# Patient Record
Sex: FEMALE | Race: WHITE
[De-identification: names, ages, dates, MRNs, and addresses within clinical notes are randomized per-mention and may not be internally consistent; named-entity substitution may affect disease eponyms.]

---

## 2017-08-09 ENCOUNTER — HOSPITAL ENCOUNTER (OUTPATIENT)
Dept: HOSPITAL 47 - RADMRIMAIN | Age: 77
Discharge: HOME | End: 2017-08-09
Payer: MEDICARE

## 2017-08-09 DIAGNOSIS — M51.36: Primary | ICD-10-CM

## 2017-08-09 DIAGNOSIS — M12.88: ICD-10-CM

## 2017-08-09 PROCEDURE — 72148 MRI LUMBAR SPINE W/O DYE: CPT

## 2017-08-09 NOTE — MR
EXAMINATION TYPE: MR lumbar spine wo con

 

DATE OF EXAM: 8/9/2017

 

COMPARISON: Report from prior lumbar MRI from outside institution dated 2009

 

HISTORY: Low back pain

 

TECHNIQUE: 

Multiplanar, multisequence images of the lumbar spine were acquired.

 

L1-L2: Left lateral extension of endplate disc complex results in right-sided foraminal encroachment,
 anterolateral mass effect on the thecal sac. No significant central stenosis.

 

L2-L3: Mild facet arthropathy. No significant central stenosis or foraminal encroachment. Minimal pos
terior broad-based disc bulge causes only slight anterior mass effect on the thecal sac.

 

L3-L4: Broad-based posterior disc bulge causes minimal anterior mass effect on the thecal sac. Facet 
arthropathy with hypertrophy of the ligamentum flavum encroaches on the lateral recesses. Circumferen
tial extension of disc material encroaches somewhat on the foramina. There is a trefoil appearance of
 the thecal sac. No significant central stenosis.

 

L4-L5: Listhesis contributes to cause some mild anterior mass effect on the thecal sac, there is face
t arthropathy with hypertrophy of the ligamentum flavum encroaching on the lateral recesses, foramina
l encroachment is greater on the left than on the right. No significant central stenosis.

 

L5-S1: Facet arthropathy changes with hypertrophy of the ligamentum flavum is noted encroaching somew
hat on the left lateral recess. Circumferential posterior disc bulge causes minimal anterior mass eff
ect on the thecal sac. Circumferential extension of disc material encroaches somewhat on the foramina
.

 

Lumbar segments are intact.  No paraspinal masses are identified.  Conus medullaris has a normal appe
arance. Minimal anterolisthesis grade 1 L4-5. There is multilevel spondylosis. Loss of disc height an
d signal greatest at L4-5, L1-2.

 

IMPRESSION:

Degenerative disc disease, multilevel facet arthropathy and foraminal encroachment as described. No s
ignificant central canal stenosis.

## 2018-03-09 ENCOUNTER — HOSPITAL ENCOUNTER (OUTPATIENT)
Dept: HOSPITAL 47 - EC | Age: 78
Setting detail: OBSERVATION
LOS: 1 days | Discharge: HOME | End: 2018-03-10
Payer: MEDICARE

## 2018-03-09 DIAGNOSIS — I16.0: ICD-10-CM

## 2018-03-09 DIAGNOSIS — Z79.899: ICD-10-CM

## 2018-03-09 DIAGNOSIS — Z88.1: ICD-10-CM

## 2018-03-09 DIAGNOSIS — E66.9: ICD-10-CM

## 2018-03-09 DIAGNOSIS — I10: ICD-10-CM

## 2018-03-09 DIAGNOSIS — J18.9: ICD-10-CM

## 2018-03-09 DIAGNOSIS — E78.5: ICD-10-CM

## 2018-03-09 DIAGNOSIS — R07.89: Primary | ICD-10-CM

## 2018-03-09 DIAGNOSIS — Z98.84: ICD-10-CM

## 2018-03-09 DIAGNOSIS — Z80.7: ICD-10-CM

## 2018-03-09 DIAGNOSIS — I42.9: ICD-10-CM

## 2018-03-09 DIAGNOSIS — Z88.8: ICD-10-CM

## 2018-03-09 DIAGNOSIS — Z87.891: ICD-10-CM

## 2018-03-09 DIAGNOSIS — Z82.49: ICD-10-CM

## 2018-03-09 DIAGNOSIS — M19.90: ICD-10-CM

## 2018-03-09 DIAGNOSIS — I49.3: ICD-10-CM

## 2018-03-09 DIAGNOSIS — R79.89: ICD-10-CM

## 2018-03-09 DIAGNOSIS — R01.1: ICD-10-CM

## 2018-03-09 DIAGNOSIS — I44.7: ICD-10-CM

## 2018-03-09 LAB
ALBUMIN SERPL-MCNC: 4 G/DL (ref 3.5–5)
ALP SERPL-CCNC: 72 U/L (ref 38–126)
ALT SERPL-CCNC: 19 U/L (ref 9–52)
ANION GAP SERPL CALC-SCNC: 10 MMOL/L
APTT BLD: 22.6 SEC (ref 22–30)
AST SERPL-CCNC: 23 U/L (ref 14–36)
BASOPHILS # BLD AUTO: 0.1 K/UL (ref 0–0.2)
BASOPHILS NFR BLD AUTO: 1 %
BUN SERPL-SCNC: 16 MG/DL (ref 7–17)
CALCIUM SPEC-MCNC: 9.2 MG/DL (ref 8.4–10.2)
CHLORIDE SERPL-SCNC: 104 MMOL/L (ref 98–107)
CK SERPL-CCNC: 58 U/L (ref 30–135)
CK SERPL-CCNC: 63 U/L (ref 30–135)
CK SERPL-CCNC: 76 U/L (ref 30–135)
CO2 SERPL-SCNC: 24 MMOL/L (ref 22–30)
D DIMER PPP FEU-MCNC: 0.62 MG/L FEU (ref ?–0.6)
EOSINOPHIL # BLD AUTO: 0.1 K/UL (ref 0–0.7)
EOSINOPHIL NFR BLD AUTO: 1 %
ERYTHROCYTE [DISTWIDTH] IN BLOOD BY AUTOMATED COUNT: 5.09 M/UL (ref 3.8–5.4)
ERYTHROCYTE [DISTWIDTH] IN BLOOD: 12.8 % (ref 11.5–15.5)
GLUCOSE SERPL-MCNC: 117 MG/DL (ref 74–99)
HCT VFR BLD AUTO: 42.9 % (ref 34–46)
HGB BLD-MCNC: 15.1 GM/DL (ref 11.4–16)
INR PPP: 1.1 (ref ?–1.2)
LIPASE SERPL-CCNC: 82 U/L (ref 23–300)
LYMPHOCYTES # SPEC AUTO: 0.9 K/UL (ref 1–4.8)
LYMPHOCYTES NFR SPEC AUTO: 11 %
MAGNESIUM SPEC-SCNC: 1.8 MG/DL (ref 1.6–2.3)
MCH RBC QN AUTO: 29.7 PG (ref 25–35)
MCHC RBC AUTO-ENTMCNC: 35.2 G/DL (ref 31–37)
MCV RBC AUTO: 84.2 FL (ref 80–100)
MONOCYTES # BLD AUTO: 0.6 K/UL (ref 0–1)
MONOCYTES NFR BLD AUTO: 7 %
NEUTROPHILS # BLD AUTO: 6.6 K/UL (ref 1.3–7.7)
NEUTROPHILS NFR BLD AUTO: 78 %
PLATELET # BLD AUTO: 260 K/UL (ref 150–450)
POTASSIUM SERPL-SCNC: 4.2 MMOL/L (ref 3.5–5.1)
PROT SERPL-MCNC: 6.5 G/DL (ref 6.3–8.2)
PT BLD: 10.3 SEC (ref 9–12)
SODIUM SERPL-SCNC: 138 MMOL/L (ref 137–145)
TROPONIN I SERPL-MCNC: 0.03 NG/ML (ref 0–0.03)
TROPONIN I SERPL-MCNC: 0.03 NG/ML (ref 0–0.03)
TROPONIN I SERPL-MCNC: 0.04 NG/ML (ref 0–0.03)
WBC # BLD AUTO: 8.5 K/UL (ref 3.8–10.6)

## 2018-03-09 PROCEDURE — 93005 ELECTROCARDIOGRAM TRACING: CPT

## 2018-03-09 PROCEDURE — 84484 ASSAY OF TROPONIN QUANT: CPT

## 2018-03-09 PROCEDURE — 83735 ASSAY OF MAGNESIUM: CPT

## 2018-03-09 PROCEDURE — 71275 CT ANGIOGRAPHY CHEST: CPT

## 2018-03-09 PROCEDURE — 96372 THER/PROPH/DIAG INJ SC/IM: CPT

## 2018-03-09 PROCEDURE — 83880 ASSAY OF NATRIURETIC PEPTIDE: CPT

## 2018-03-09 PROCEDURE — 71046 X-RAY EXAM CHEST 2 VIEWS: CPT

## 2018-03-09 PROCEDURE — 36415 COLL VENOUS BLD VENIPUNCTURE: CPT

## 2018-03-09 PROCEDURE — 85730 THROMBOPLASTIN TIME PARTIAL: CPT

## 2018-03-09 PROCEDURE — 96375 TX/PRO/DX INJ NEW DRUG ADDON: CPT

## 2018-03-09 PROCEDURE — 82550 ASSAY OF CK (CPK): CPT

## 2018-03-09 PROCEDURE — 93306 TTE W/DOPPLER COMPLETE: CPT

## 2018-03-09 PROCEDURE — 85379 FIBRIN DEGRADATION QUANT: CPT

## 2018-03-09 PROCEDURE — 96365 THER/PROPH/DIAG IV INF INIT: CPT

## 2018-03-09 PROCEDURE — 85025 COMPLETE CBC W/AUTO DIFF WBC: CPT

## 2018-03-09 PROCEDURE — 85610 PROTHROMBIN TIME: CPT

## 2018-03-09 PROCEDURE — 80061 LIPID PANEL: CPT

## 2018-03-09 PROCEDURE — 82553 CREATINE MB FRACTION: CPT

## 2018-03-09 PROCEDURE — 83690 ASSAY OF LIPASE: CPT

## 2018-03-09 PROCEDURE — 99285 EMERGENCY DEPT VISIT HI MDM: CPT

## 2018-03-09 PROCEDURE — 80053 COMPREHEN METABOLIC PANEL: CPT

## 2018-03-09 RX ADMIN — ATORVASTATIN CALCIUM SCH MG: 80 TABLET, FILM COATED ORAL at 14:23

## 2018-03-09 RX ADMIN — ASPIRIN SCH DROP: 325 TABLET ORAL at 16:20

## 2018-03-09 RX ADMIN — OFLOXACIN SCH DROPS: 3 SOLUTION/ DROPS OPHTHALMIC at 11:46

## 2018-03-09 RX ADMIN — OFLOXACIN SCH DROPS: 3 SOLUTION/ DROPS OPHTHALMIC at 19:56

## 2018-03-09 RX ADMIN — DILTIAZEM HYDROCHLORIDE SCH MG: 180 CAPSULE, COATED, EXTENDED RELEASE ORAL at 12:07

## 2018-03-09 RX ADMIN — METOPROLOL TARTRATE SCH: 25 TABLET, FILM COATED ORAL at 12:13

## 2018-03-09 RX ADMIN — GUAIFENESIN PRN MG: 200 SOLUTION ORAL at 21:22

## 2018-03-09 RX ADMIN — METOPROLOL TARTRATE SCH MG: 25 TABLET, FILM COATED ORAL at 19:56

## 2018-03-09 RX ADMIN — METOPROLOL TARTRATE SCH: 25 TABLET, FILM COATED ORAL at 20:02

## 2018-03-09 RX ADMIN — LISINOPRIL SCH MG: 20 TABLET ORAL at 12:07

## 2018-03-09 RX ADMIN — OFLOXACIN SCH DROPS: 3 SOLUTION/ DROPS OPHTHALMIC at 16:20

## 2018-03-09 RX ADMIN — ISOSORBIDE MONONITRATE SCH MG: 30 TABLET, EXTENDED RELEASE ORAL at 12:07

## 2018-03-09 RX ADMIN — HEPARIN SODIUM SCH UNIT: 5000 INJECTION, SOLUTION INTRAVENOUS; SUBCUTANEOUS at 19:55

## 2018-03-09 NOTE — P.HPIM
History of Present Illness


H&P Date: 18


Chief Complaint: Chest pressure and cough





This is a 77-year-old female, patient of Dr. Melgar.  She has a known past 

medical history of hypertension, hyperlipidemia, cardiomyopathy, left bundle 

branch block, PVCs and heart murmur.  She reports about a 4 day history of 

productive cough.  She states that her cough is showing some improvement.  

However, this morning she started to have a lot of chest pressure in the center 

of her chest.  It did not radiate.  But she did become nauseous and had some 

sweating.  She took 2 nitro and had some relief of her symptoms.  The pain did 

come back.  She presented to the emergency room for further evaluation and 

treatment.  She had elevated blood pressure with a 210/100.  EKG had shown 

normal sinus rhythm with sinus arrhythmia.  First troponin was negative second 

troponin minimally elevated at 0.037.  She did have elevated d-dimer CTA of the 

chest was negative for PE.  Did show clustered nodular densities within the 

posterior basilar segment of the right lower lobe are most suspicious of a 

developing pneumonia although multiple pulmonary nodules are possible they're 

less likely.  Follow-up after treatment could be performed to exclude pulmonary 

nodule.  Associated mediastinal adenopathy is likely reactive and only slightly 

more pronounced than prior exam of .  Patient was started on IV Levaquin.  

Her last stress test was about 5 months ago and at that time she had an echo 

completed [facility].  Cardiology is following and they 

are reviewing these results.  Patient reports her last heart catheterization 

was in .  She does have a prior history of smoking for only about 4 years.  

Pulmonary service will be consulted





Review of Systems





Please refer to HPI otherwise unremarkable





Past Medical History


Past Medical History: Hyperlipidemia, Hypertension, Neurologic Disorder, 

Osteoarthritis (OA)


Additional Past Medical History / Comment(s): pvc 's  murmur  lbbb 

cardiomyopathy atheroscleroris


History of Any Multi-Drug Resistant Organisms: None Reported


Past Surgical History: Adenoidectomy, Appendectomy, Cholecystectomy, 

Hysterectomy, Joint Replacement, Orthopedic Surgery, Tonsillectomy


Additional Past Surgical History / Comment(s): lapband


Past Anesthesia/Blood Transfusion Reactions: Postoperative Nausea & Vomiting (

PONV)


Past Psychological History: No Psychological Hx Reported


Smoking Status: Never smoker


Past Alcohol Use History: None Reported


Past Drug Use History: None Reported





- Past Family History


  ** Father


Family Medical History: Cancer


Additional Family Medical History / Comment(s): Father had lymphosarcoma





  ** Mother


Family Medical History: Coronary Artery Disease (CAD), Myocardial Infarction (MI

)


Additional Family Medical History / Comment(s): Mother  of a MI at the age 

of 66yrs.





Medications and Allergies


 Home Medications











 Medication  Instructions  Recorded  Confirmed  Type


 


Cyanocobalamin (Vitamin B-12) 1,000 mcg PO PC-LUNCH 18 History





[Vitamin B-12]    


 


Diltiazem HCl [Diltiazem 24Hr ER] 180 mg PO DAILY 18 History


 


HYDROcodone/APAP 5-325MG [Norco 0.5 tab PO HS PRN 18 History





5-325]    


 


Isosorbide Mononitrate ER [Imdur] 30 mg PO AC-BRKFST 18 History


 


Lisinopril [Prinivil] 20 mg PO DAILY 18 History


 


Lovastatin [Mevacor] 20 mg PO HS 18 History


 


Magnesium 200 mg PO HS 18 History


 


Nepafenac [Ilevro] 1 drop LEFT EYE QAM 18 History


 


Nitroglycerin Sl Tabs [Nitrostat] 0.4 mg SUBLINGUAL Q5M PRN 18 

History


 


Ofloxacin 0.3% Ophth Soln [Ocuflox 1 drop LEFT EYE TID 18 History





Ophth Soln]    


 


cloNIDine HCL [Catapres] 0.1 mg PO DAILY 18 History











 Allergies











Allergy/AdvReac Type Severity Reaction Status Date / Time


 


erythromycin base Allergy  Rash/Hives Verified 18 07:37


 


meclizine Allergy  Rash/Hives Verified 18 07:37














Physical Exam


Vitals: 


 Vital Signs











  Temp Pulse Pulse Resp BP BP BP


 


 18 11:52  99.3 F   73  16    188/63


 


 18 09:01  98.2 F      142/58 


 


 18 08:00  98.1 F  74   16  158/69  


 


 18 06:40     20   


 


 18 06:37      165/72  


 


 18 06:30   88   20  190/78  


 


 18 06:13  99.3 F  89   18  210/100  














  Pulse Ox


 


 18 11:52  93 L


 


 18 09:01  91 L


 


 18 08:00  95


 


 18 06:40 


 


 18 06:37 


 


 18 06:30  95


 


 18 06:13  97








 Intake and Output











 18





 22:59 06:59 14:59


 


Other:   


 


  Weight  77.111 kg 














Head normocephalic


Neck supple


Lungs diminished with a few coarse breath sounds.  No wheezing.  No tenderness 

with palpation of the chest wall


Heart regular rate and rhythm S1-S2, no rub or gallop


Abdomen is soft nontender nondistended positive bowel sounds no 

hepatosplenomegaly


Extremities no edema


Neuro alert and orientated to 3





Results


CBC & Chem 7: 


 18 06:10





 18 06:10


Labs: 


 Abnormal Lab Results - Last 24 Hours (Table)











  18 Range/Units





  06:10 06:10 06:10 


 


Lymphocytes #  0.9 L    (1.0-4.8)  k/uL


 


D-Dimer    0.62 H  (<0.60)  mg/L FEU


 


Glucose   117 H   (74-99)  mg/dL


 


Troponin I     (0.000-0.034)  ng/mL














  18 Range/Units





  12:21 


 


Lymphocytes #   (1.0-4.8)  k/uL


 


D-Dimer   (<0.60)  mg/L FEU


 


Glucose   (74-99)  mg/dL


 


Troponin I  0.037 H*  (0.000-0.034)  ng/mL














Thrombosis Risk Factor Assmnt





- Choose All That Apply


Any of the Below Risk Factors Present?: Yes


Each Factor Represents 1 point: Obesity (BMI >25)


Other Risk Factors: Yes


Each Risk Factor Represents 3 Points: Age 75 years or older


Other congenital or acquired thrombophilia - If yes, enter type in comment: No


Thrombosis Risk Factor Assessment Total Risk Factor Score: 4


Thrombosis Risk Factor Assessment Level: Moderate Risk





Assessment and Plan


Assessment: 





1.  Chest pain: Minimal elevation and second troponin of 0.037.  Case discussed 

with cardiology.  They did not fill it was related to an acute coronary 

syndrome.  EKG showing a normal sinus rhythm with sinus arrhythmia.  Third 

troponin pending.  CTA was negative for PE.  Chest pain symptoms could be 

related to her cough from her pneumonia.  Continue to monitor.  Cardiology is 

reviewing the stress test and echo that was performed about 5 months ago 

outpatient





2.  Community-acquired Pneumonia: CT chest shows clustered nodular densities 

within the posterior basilar segment of the right lower lobe possibly related 

to pneumonia or a pulmonary nodule.  Consult pulmonary service.  Patient's been 

started on Levaquin 500 mg IV daily in the emergency room.  Check sputum culture





3.  Hypertensive urgency in the emergency room with a blood pressure of 210/

100.  Patient did give 1 dose of IV labetalol in the ER.  Case discussed with 

cardiology NP.  They have increased her Catapres to 0.2 mg daily.  Resume her 

other home BP meds





4.  Hyperlipidemia continue statin





5.  History of cardiomyopathy.  Followed by cardiology





6.  Osteoarthritis





GI prophylaxis Pepcid and DVT prophylaxis subcu heparin








Time with Patient: Greater than 30 (Greater than 50% of the total time spent in 

counseling and coordination of care.I performed an examination of the patient 

and discussed their management with the physician Assistant.  I have reviewed 

the Physician Assistant's notes and agree with the documented findings and plan 

of care)

## 2018-03-09 NOTE — CT
EXAMINATION TYPE: CT angio chest

 

DATE OF EXAM: 3/9/2018

 

COMPARISON: 6/17/2010

 

HISTORY: Chest pain

 

CT DLP: 326.6 mGycm. Automated Exposure Control for Dose Reduction was Utilized.

 

 

CONTRAST: 

CTA scan of the thorax is performed with IV Contrast, patient injected with 78 mL of Omnipaque 350, p
ulmonary embolism protocol.  MIP Images are created on CT scanner and reviewed.

 

FINDINGS:

 

LUNGS: There are scattered areas of subsegmental atelectasis and multiple other nodular densities wit
hin the posterior basilar segment of the right lower lobe suspicious for early infectious etiology shaw
ch as pneumonia. The lungs are grossly clear, there is no concerning parenchymal mass or nodule ident
ified.   There is no pleural effusion or pneumothorax seen.  The tracheobronchial tree is patent.

 

MEDIASTINUM: There is satisfactory enhancement of the pulmonary artery and its branches, there is no 
CT evidence for pulmonary embolism. Mediastinal adenopathy is seen within the right hilum measuring 1
.2 cm, within the subcarinal space measuring 1.5 cm, and within the, paratracheal space measuring 1.2
 cm. No cardiomegaly or pericardial effusion is seen. Normal variant branching vasculature are seen a
s the left vertebral artery has a direct origin from the aortic arch. Moderate three-vessel coronary 
artery calcifications are present.

 

OTHER: Gastric lap band is noted in the upper abdomen, bile duct appears prominent although within no
rmal limits given the patient's age. Moderate multilevel degenerative changes of the thoracic spine a
re seen.

 

IMPRESSION: 

1. No evidence of pulmonary embolism.

2. Clustered nodular densities within the posterior basilar segment of the right lower lobe are most 
suspicious for developing pneumonia although multiple pulmonary nodules are possible they are less li
nisa. Follow-up after treatment could be performed to exclude pulmonary nodule. Associated mediastina
l adenopathy is likely reactive and only slightly more pronounced than on the prior exam of 2010.

## 2018-03-09 NOTE — P.CRDCN
History of Present Illness


Consult date: 03/09/18


History of present illness: 


This is a 77-year-old female with history of hypertension and 

hypercholesterolemia who has been suffering from sore throat and cough for 

about a couple weeks.  Patient came to the hospital because of persistent cough 

and also feeling hot.  She also complains of some chest heaviness in the middle 

of the chest.  She claims that heaviness did not change with cough or 

inspiration.  She took couple of nitroglycerin at home without much relief.  

Finally patient came to the emergency room.  Her d-dimer was elevated.  Patient 

had a computed tomography scan of the chest which was negative for pulmonary 

emboli or dissection.  However, there was findings consistent with possible 

pneumonia.  Patient is admitted and is being treated for pneumonia with 

antibiotics.  At the time of my examination patient seemed to be feeling 

better.  Denies any chest pain at this time.  Patient recently had a stress 

test and echocardiogram in our office and we're in the process of getting 

copies of the.  I will also get a chest x-ray.  If pneumonia is confirmed, I'll 

continue with antibiotic therapy.  Probably no further cardiac workup at this 

time unless patient has recurrence of symptoms.








Past Medical History


Past Medical History: Hyperlipidemia, Hypertension, Neurologic Disorder, 

Osteoarthritis (OA)


Additional Past Medical History / Comment(s): pvc 's  murmur  lbbb 

cardiomyopathy atheroscleroris


History of Any Multi-Drug Resistant Organisms: None Reported


Past Surgical History: Adenoidectomy, Appendectomy, Cholecystectomy, 

Hysterectomy, Joint Replacement, Orthopedic Surgery, Tonsillectomy


Additional Past Surgical History / Comment(s): lapband


Past Psychological History: No Psychological Hx Reported


Smoking Status: Never smoker


Past Alcohol Use History: None Reported


Past Drug Use History: None Reported





Medications and Allergies


 Home Medications











 Medication  Instructions  Recorded  Confirmed  Type


 


Cyanocobalamin (Vitamin B-12) 1,000 mcg PO PC-LUNCH 03/09/18 03/09/18 History





[Vitamin B-12]    


 


Diltiazem HCl [Diltiazem 24Hr ER] 180 mg PO DAILY 03/09/18 03/09/18 History


 


HYDROcodone/APAP 5-325MG [Norco 0.5 tab PO HS PRN 03/09/18 03/09/18 History





5-325]    


 


Isosorbide Mononitrate ER [Imdur] 30 mg PO AC-BRKFST 03/09/18 03/09/18 History


 


Lisinopril [Prinivil] 20 mg PO DAILY 03/09/18 03/09/18 History


 


Lovastatin [Mevacor] 20 mg PO HS 03/09/18 03/09/18 History


 


Magnesium 200 mg PO HS 03/09/18 03/09/18 History


 


Nepafenac [Ilevro] 1 drop LEFT EYE QAM 03/09/18 03/09/18 History


 


Nitroglycerin Sl Tabs [Nitrostat] 0.4 mg SUBLINGUAL Q5M PRN 03/09/18 03/09/18 

History


 


Ofloxacin 0.3% Ophth Soln [Ocuflox 1 drop LEFT EYE TID 03/09/18 03/09/18 History





Ophth Soln]    


 


cloNIDine HCL [Catapres] 0.1 mg PO DAILY 03/09/18 03/09/18 History











 Allergies











Allergy/AdvReac Type Severity Reaction Status Date / Time


 


erythromycin base Allergy  Rash/Hives Verified 03/09/18 07:37


 


meclizine Allergy  Rash/Hives Verified 03/09/18 07:37














Physical Exam


Vitals: 


 Vital Signs











  Temp Pulse Resp BP BP Pulse Ox


 


 03/09/18 09:01  98.2 F     142/58  91 L


 


 03/09/18 08:00  98.1 F  74  16  158/69   95


 


 03/09/18 06:40    20   


 


 03/09/18 06:37     165/72  


 


 03/09/18 06:30   88  20  190/78   95


 


 03/09/18 06:13  99.3 F  89  18  210/100   97








 Intake and Output











 03/08/18 03/09/18 03/09/18





 22:59 06:59 14:59


 


Other:   


 


  Weight  77.111 kg 














GENERAL EXAM: Patient is alert and oriented and doesn't appear to be in any 

acute distress


HEENT: Normocephalic. Normal reaction of pupils, equal size, normal range of 

extraocular motion. No erythema or exudates in the throat.


NECK: No masses, no nuchal rigidity.


CHEST: No chest wall deformity.


LUNGS: Equal air entry with no crackles or wheeze.


HEART: S1 and S2 normal with no audible mumurs or gallops. Regular rhythm,


ABDOMEN: No hepatosplenomegaly, normal bowel sounds, no guarding or rigidity.


SKIN: No rashes


CENTRAL NERVOUS SYSTEM: No focal deficits.


EXTREMITIES: No cyanosis, clubbing or edema.





Results





 03/09/18 06:10





 03/09/18 06:10


 Cardiac Enzymes











  03/09/18 03/09/18 Range/Units





  06:10 06:10 


 


AST   23  (14-36)  U/L


 


CK-MB (CK-2)  1.2   (0.0-2.4)  ng/mL


 


Troponin I  0.032   (0.000-0.034)  ng/mL








 Coagulation











  03/09/18 Range/Units





  06:10 


 


PT  10.3  (9.0-12.0)  sec


 


APTT  22.6  (22.0-30.0)  sec








 CBC











  03/09/18 Range/Units





  06:10 


 


WBC  8.5  (3.8-10.6)  k/uL


 


RBC  5.09  (3.80-5.40)  m/uL


 


Hgb  15.1  (11.4-16.0)  gm/dL


 


Hct  42.9  (34.0-46.0)  %


 


Plt Count  260  (150-450)  k/uL








 Comprehensive Metabolic Panel











  03/09/18 Range/Units





  06:10 


 


Sodium  138  (137-145)  mmol/L


 


Potassium  4.2  (3.5-5.1)  mmol/L


 


Chloride  104  ()  mmol/L


 


Carbon Dioxide  24  (22-30)  mmol/L


 


BUN  16  (7-17)  mg/dL


 


Creatinine  0.60  (0.52-1.04)  mg/dL


 


Glucose  117 H  (74-99)  mg/dL


 


Calcium  9.2  (8.4-10.2)  mg/dL


 


AST  23  (14-36)  U/L


 


ALT  19  (9-52)  U/L


 


Alkaline Phosphatase  72  ()  U/L


 


Total Protein  6.5  (6.3-8.2)  g/dL


 


Albumin  4.0  (3.5-5.0)  g/dL








 Current Medications











Generic Name Dose Route Start Last Admin





  Trade Name Freq  PRN Reason Stop Dose Admin


 


Aspirin  325 mg  03/10/18 09:00  





  Aspirin  PO   





  DAILY [facility]   


 


Atorvastatin Calcium  80 mg  03/09/18 09:00  





  Lipitor  PO   





  DAILY [facility]   


 


Levofloxacin 500 mg/ IV  100 mls @ 100 mls/hr  03/10/18 09:00  





  Solution  IVPB   





  Q24H [facility]   


 


Metoprolol Tartrate  25 mg  03/09/18 09:00  





  Lopressor  PO   





  BID [facility]   


 


Miscellaneous Information  1 each  03/09/18 06:31  





  Rx Info: Iv Contrast Was Given  MISCELLANE  03/11/18 06:31  





  DAILY PRN   





  Per Protocol   


 


Morphine Sulfate  4 mg  03/09/18 06:05  





  Morphine Sulfate (Inj)  IV   





  Q5M PRN   





  Chest Pain   


 


Nitroglycerin  0.4 mg  03/09/18 06:05  





  Nitrostat  SUBLINGUAL   





  Q5M PRN   





  Chest Pain   








 Intake and Output











 03/08/18 03/09/18 03/09/18





 22:59 06:59 14:59


 


Other:   


 


  Weight  77.111 kg 








 





 03/09/18 06:10 





 03/09/18 06:10 











EKG Interpretations (text)





Sinus rhythm with left bundle-branch block pattern





Assessment and Plan


(1) Left bundle branch block (LBBB)


Current Visit: Yes   Status: Acute   Code(s): I44.7 - LEFT BUNDLE-BRANCH BLOCK, 

UNSPECIFIED   SNOMED Code(s): 97378243


   





(2) Chest pain


Current Visit: Yes   Status: Acute   Code(s): R07.9 - CHEST PAIN, UNSPECIFIED   

SNOMED Code(s): 75385000


   





(3) Healthcare-associated pneumonia


Current Visit: Yes   Status: Acute   Code(s): J18.9 - PNEUMONIA, UNSPECIFIED 

ORGANISM   SNOMED Code(s): 129938713


   


Plan: 


I'll continue with antibiotic therapy.  I'll get a chest x-ray.  I'll get the 

recent cardiac testing done in our office.  Further recommendations depend upon 

clinical course.

## 2018-03-09 NOTE — XR
EXAMINATION TYPE: XR chest 2V

 

DATE OF EXAM: 3/9/2018

 

COMPARISON: 6/17/2010

 

HISTORY: Shortness of breath and productive cough

 

TECHNIQUE:  Frontal and lateral views of the chest are obtained.

 

FINDINGS:  Mild patchy opacity at the right lung base favored to represent atelectasis is no focal op
acity is seen on the lateral image, however this could represent early pneumonia in this patient with
 shortness of breath and productive cough. There is no pleural effusion or pneumothorax.  The cardiac
 silhouette size is within normal limits.   The osseous structures are intact. There is generalized o
sseous demineralization.

 

IMPRESSION:  New patchy right basilar opacity favored to represent atelectasis although this could re
present early developing pneumonia in this patient with shortness of breath and productive cough.

## 2018-03-09 NOTE — ED
General Adult HPI





<Eldon Forbes WM - Last Filed: 18 08:49>





- General


Source: RN notes reviewed, old records reviewed





<Shin Sanchez - Last Filed: 18 06:58>





- General


Stated complaint: chest pain


Time Seen by Provider: 18 06:02





- History of Present Illness


Initial comments: 





This is a 77-year-old female to the ER for evaluation regarding significant 

chest pain left-sided sharp chest pain rating to her back.  Patient has pain on 

and off for 3 days, patient states chest pain with severe tonight and decided 

to come to the emergency room.  Patient is in route to [facility] where she 

went to get checked out today as opposed earlier.  No shortness of breath, 

patient does have no chest pain at this time (Shin Sanchez)





- Related Data


 Home Medications











 Medication  Instructions  Recorded  Confirmed


 


Cyanocobalamin (Vitamin B-12) 1,000 mcg PO PC-LUNCH 18





[Vitamin B-12]   


 


Diltiazem HCl [Diltiazem 24Hr ER] 180 mg PO DAILY 18


 


HYDROcodone/APAP 5-325MG [Norco 0.5 tab PO HS PRN 18





5-325]   


 


Isosorbide Mononitrate ER [Imdur] 30 mg PO AC-BRKFST 18


 


Lisinopril [Prinivil] 20 mg PO DAILY 18


 


Lovastatin [Mevacor] 20 mg PO HS 18


 


Magnesium 200 mg PO HS 18


 


Nepafenac [Ilevro] 1 drop LEFT EYE QAM 18


 


Nitroglycerin Sl Tabs [Nitrostat] 0.4 mg SUBLINGUAL Q5M PRN 18


 


Ofloxacin 0.3% Ophth Soln [Ocuflox 1 drop LEFT EYE TID 18





Ophth Soln]   


 


cloNIDine HCL [Catapres] 0.1 mg PO DAILY 18








 Previous Rx's











 Medication  Instructions  Recorded


 


Aspirin 325 mg PO DAILY  tab 03/10/18


 


Levofloxacin [Levaquin] 500 mg PO DAILY 10 Days #10 tab 03/10/18


 


Metoprolol Tartrate [Lopressor] 25 mg PO BID  tab 03/10/18


 


guaiFENesin [Mucinex] 600 mg PO Q12HR 10 Days #20 03/10/18





 tablet.er 











 Allergies











Allergy/AdvReac Type Severity Reaction Status Date / Time


 


erythromycin base Allergy  Rash/Hives Verified 18 07:37


 


meclizine Allergy  Rash/Hives Verified 18 07:37














Review of Systems


ROS Other: All systems not noted in ROS Statement are negative.





<Eldon Forbes - Last Filed: 18 08:49>


ROS Other: All systems not noted in ROS Statement are negative.





<Shin Sanchez - Last Filed: 18 06:58>


ROS Statement: 


Those systems with pertinent positive or pertinent negative responses have been 

documented in the HPI.








Past Medical History


Past Medical History: Hyperlipidemia, Hypertension, Neurologic Disorder, 

Osteoarthritis (OA)


Additional Past Medical History / Comment(s): pvc 's  murmur  lbbb 

cardiomyopathy atheroscleroris


History of Any Multi-Drug Resistant Organisms: None Reported


Past Surgical History: Adenoidectomy, Appendectomy, Cholecystectomy, 

Hysterectomy, Joint Replacement, Orthopedic Surgery, Tonsillectomy


Additional Past Surgical History / Comment(s): lapband


Past Psychological History: No Psychological Hx Reported


Smoking Status: Never smoker


Past Alcohol Use History: None Reported


Past Drug Use History: None Reported





- Past Family History


  ** Father


Family Medical History: Cancer


Additional Family Medical History / Comment(s): Father had lymphosarcoma





  ** Mother


Family Medical History: Coronary Artery Disease (CAD), Myocardial Infarction (MI

)


Additional Family Medical History / Comment(s): Mother  of a MI at the age 

of 66yrs.





<Shin Sanchez - Last Filed: 18 06:58>





General Exam


General appearance: alert, in no apparent distress


Head exam: Present: atraumatic, normocephalic, normal inspection


Eye exam: Present: normal appearance, PERRL, EOMI.  Absent: scleral icterus, 

conjunctival injection, periorbital swelling


ENT exam: Present: normal exam, mucous membranes moist


Neck exam: Present: normal inspection.  Absent: tenderness, meningismus, 

lymphadenopathy


Respiratory exam: Present: normal lung sounds bilaterally.  Absent: respiratory 

distress, wheezes, rales, rhonchi, stridor


Cardiovascular Exam: Present: regular rate, normal rhythm, normal heart sounds.

  Absent: systolic murmur, diastolic murmur, rubs, gallop, clicks


GI/Abdominal exam: Present: soft, normal bowel sounds.  Absent: distended, 

tenderness, guarding, rebound, rigid


Extremities exam: Present: normal inspection, full ROM, normal capillary 

refill.  Absent: tenderness, pedal edema, joint swelling, calf tenderness


Back exam: Present: normal inspection


Neurological exam: Present: alert, oriented X3, CN II-XII intact


Psychiatric exam: Present: normal affect, normal mood


Skin exam: Present: warm, dry, intact, normal color.  Absent: rash





<Shin Sanchez - Last Filed: 18 06:58>


 Vital Signs











  18





  06:13 06:30 06:37


 


Temperature 99.3 F  


 


Pulse Rate 89 88 


 


Respiratory 18 20 





Rate   


 


Blood Pressure 210/100 190/78 165/72


 


O2 Sat by Pulse 97 95 





Oximetry   














  18





  06:40 08:00


 


Temperature  98.1 F


 


Pulse Rate  74


 


Respiratory 20 16





Rate  


 


Blood Pressure  158/69


 


O2 Sat by Pulse  95





Oximetry  














EKG Findings





- EKG Comments:


EKG Findings:: EKG shows sinus rhythm rate of 84, , , QTc 482





<Shin Sanchez - Last Filed: 18 06:58>





Medical Decision Making





- Lab Data


Result diagrams: 


 18 06:10





 18 06:10





<Eldon Forbes - Last Filed: 18 08:49>





- Lab Data


Result diagrams: 


 03/10/18 05:24





 03/10/18 05:24





<Shin Sanchez - Last Filed: 18 06:58>





- Medical Decision Making





77-year-old female presenting with chest pain.  Patient was signed out awaiting 

CT angiography.  Patient was admitted for serial cardiac enzymes and cardiology 

consult.  CT angiography is negative for PE or dissection, there is right lower 

lobe pneumonia.  Patient does admit to cough.  Antibiotics ordered. 








 Initial presentation, patient is quite hypertensive, this does resolve with 

medical treatment. (Eldon Forbes)





Disposition


Decision to Admit Reason: Admit from EC


Decision Date: 18


Decision Time: 08:00





<Eldon Forbes - Last Filed: 18 08:49>





<Shin Sanchez - Last Filed: 18 06:58>


Clinical Impression: 


 Healthcare-associated pneumonia, Chest pain





Disposition: ADMITTED AS IP TO THIS HOSP


Condition: Stable

## 2018-03-10 VITALS
DIASTOLIC BLOOD PRESSURE: 53 MMHG | SYSTOLIC BLOOD PRESSURE: 107 MMHG | HEART RATE: 57 BPM | TEMPERATURE: 97.9 F | RESPIRATION RATE: 18 BRPM

## 2018-03-10 LAB
ALBUMIN SERPL-MCNC: 2.9 G/DL (ref 3.5–5)
ALP SERPL-CCNC: 52 U/L (ref 38–126)
ALT SERPL-CCNC: 22 U/L (ref 9–52)
ANION GAP SERPL CALC-SCNC: 6 MMOL/L
AST SERPL-CCNC: 20 U/L (ref 14–36)
BASOPHILS # BLD AUTO: 0 K/UL (ref 0–0.2)
BASOPHILS NFR BLD AUTO: 0 %
BUN SERPL-SCNC: 25 MG/DL (ref 7–17)
CALCIUM SPEC-MCNC: 8.6 MG/DL (ref 8.4–10.2)
CHLORIDE SERPL-SCNC: 104 MMOL/L (ref 98–107)
CHOLEST SERPL-MCNC: 127 MG/DL (ref ?–200)
CO2 SERPL-SCNC: 25 MMOL/L (ref 22–30)
EOSINOPHIL # BLD AUTO: 0.1 K/UL (ref 0–0.7)
EOSINOPHIL NFR BLD AUTO: 2 %
ERYTHROCYTE [DISTWIDTH] IN BLOOD BY AUTOMATED COUNT: 4.27 M/UL (ref 3.8–5.4)
ERYTHROCYTE [DISTWIDTH] IN BLOOD: 13.3 % (ref 11.5–15.5)
GLUCOSE SERPL-MCNC: 91 MG/DL (ref 74–99)
HCT VFR BLD AUTO: 36.9 % (ref 34–46)
HDLC SERPL-MCNC: 50 MG/DL (ref 40–60)
HGB BLD-MCNC: 12 GM/DL (ref 11.4–16)
LDLC SERPL CALC-MCNC: 66 MG/DL (ref 0–99)
LYMPHOCYTES # SPEC AUTO: 1.6 K/UL (ref 1–4.8)
LYMPHOCYTES NFR SPEC AUTO: 33 %
MCH RBC QN AUTO: 28.2 PG (ref 25–35)
MCHC RBC AUTO-ENTMCNC: 32.5 G/DL (ref 31–37)
MCV RBC AUTO: 86.6 FL (ref 80–100)
MONOCYTES # BLD AUTO: 0.6 K/UL (ref 0–1)
MONOCYTES NFR BLD AUTO: 12 %
NEUTROPHILS # BLD AUTO: 2.4 K/UL (ref 1.3–7.7)
NEUTROPHILS NFR BLD AUTO: 50 %
PLATELET # BLD AUTO: 224 K/UL (ref 150–450)
POTASSIUM SERPL-SCNC: 4.3 MMOL/L (ref 3.5–5.1)
PROT SERPL-MCNC: 5.1 G/DL (ref 6.3–8.2)
SODIUM SERPL-SCNC: 135 MMOL/L (ref 137–145)
TRIGL SERPL-MCNC: 57 MG/DL (ref ?–150)
WBC # BLD AUTO: 4.9 K/UL (ref 3.8–10.6)

## 2018-03-10 RX ADMIN — DILTIAZEM HYDROCHLORIDE SCH MG: 180 CAPSULE, COATED, EXTENDED RELEASE ORAL at 08:29

## 2018-03-10 RX ADMIN — HEPARIN SODIUM SCH UNIT: 5000 INJECTION, SOLUTION INTRAVENOUS; SUBCUTANEOUS at 08:30

## 2018-03-10 RX ADMIN — GUAIFENESIN PRN MG: 200 SOLUTION ORAL at 08:31

## 2018-03-10 RX ADMIN — ATORVASTATIN CALCIUM SCH MG: 80 TABLET, FILM COATED ORAL at 08:29

## 2018-03-10 RX ADMIN — OFLOXACIN SCH DROPS: 3 SOLUTION/ DROPS OPHTHALMIC at 08:31

## 2018-03-10 RX ADMIN — ISOSORBIDE MONONITRATE SCH MG: 30 TABLET, EXTENDED RELEASE ORAL at 08:30

## 2018-03-10 RX ADMIN — LISINOPRIL SCH MG: 20 TABLET ORAL at 08:29

## 2018-03-10 RX ADMIN — METOPROLOL TARTRATE SCH: 25 TABLET, FILM COATED ORAL at 08:58

## 2018-03-10 RX ADMIN — ASPIRIN SCH DROP: 325 TABLET ORAL at 08:29

## 2018-03-10 NOTE — P.DS
Providers


Date of admission: 


03/09/18 06:05





Expected date of discharge: 03/10/18


Attending physician: 


Inge Munoz





Consults: 





 





03/09/18 06:05


Consult Physician Urgent 


   Consulting Provider: Miguel Kauffman


   Consult Reason/Comments: cp


   Do you want consulting provider notified?: Yes





03/09/18 14:57


Consult Physician Routine 


   Consulting Provider: Toby Tabor


   Consult Reason/Comments: pulmonary nodules


   Do you want consulting provider notified?: Yes











Primary care physician: 


Jade Meglar





[facility] Course: 








Diagnoses on discharge:





1.  Chest pain: Minimal elevation and second troponin of 0.037.  Case discussed 

with cardiology.  They did not fill it was related to an acute coronary 

syndrome.  EKG showing a normal sinus rhythm with sinus arrhythmia.  Third 

troponin pending.  CTA was negative for PE.  Chest pain symptoms could be 

related to her cough from her pneumonia.  Continue to monitor.  Cardiology is 

reviewing the stress test and echo that was performed about 5 months ago 

outpatient. Patient was evaluated by cardiology and cleared for discharge.





2.  Community-acquired Pneumonia: CT chest shows clustered nodular densities 

within the posterior basilar segment of the right lower lobe possibly related 

to pneumonia or a pulmonary nodule.  Consult pulmonary service.  Patient's been 

started on Levaquin 500 mg IV daily in the emergency room.  Check sputum culture





3.  Hypertensive urgency in the emergency room with a blood pressure of 210/

100.  Patient did give 1 dose of IV labetalol in the ER.  Case discussed with 

cardiology NP.  They have increased her Catapres to 0.2 mg daily.  Resume her 

other home BP meds





4.  Hyperlipidemia continue statin





5.  History of cardiomyopathy.  Followed by cardiology





6.  Osteoarthritis








Hospital Course:








This is a 77-year-old female, patient of Dr. Melgar.  She has a known past 

medical history of hypertension, hyperlipidemia, cardiomyopathy, left bundle 

branch block, PVCs and heart murmur.  She reports about a 4 day history of 

productive cough.  She states that her cough is showing some improvement.  

However, this morning she started to have a lot of chest pressure in the center 

of her chest.  It did not radiate.  But she did become nauseous and had some 

sweating.  She took 2 nitro and had some relief of her symptoms.  The pain did 

come back.  She presented to the emergency room for further evaluation and 

treatment.  She had elevated blood pressure with a 210/100.  EKG had shown 

normal sinus rhythm with sinus arrhythmia.  First troponin was negative second 

troponin minimally elevated at 0.037.  She did have elevated d-dimer CTA of the 

chest was negative for PE.  Did show clustered nodular densities within the 

posterior basilar segment of the right lower lobe are most suspicious of a 

developing pneumonia although multiple pulmonary nodules are possible they're 

less likely.  Follow-up after treatment could be performed to exclude pulmonary 

nodule.  Associated mediastinal adenopathy is likely reactive and only slightly 

more pronounced than prior exam of 2010.  Patient was started on IV Levaquin.  

Her last stress test was about 5 months ago and at that time she had an echo 

completed outpatient [facility].  Cardiology is following and they 

are reviewing these results.  Patient reports her last heart catheterization 

was in 2010.  She does have a prior history of smoking for only about 4 years.  

Pulmonary service will be consulted.





On 03/10/2018 patient was seen and examined she is feeling better she is 

complaining of occasional cough without sputum production otherwise she denies 

any complaints there is no new episodes of chest pain.  She was seen by 

cardiology and was cleared to be discharged home.


Patient was counseled to stay to receive IV antibiotic for her pneumonia, 

however she was insisting on going home, she was given a prescription for 

Levaquin 500 milligram once daily for 10 days, she was also given the Mucinex 

600 mg twice daily for 10 days.  She was told to follow-up with her primary 

care physician Dr. Melgar within one week.





During this admission patient had elevated blood pressure readings, metoprolol 

tartrate 25 mg twice daily was added to her regimen, blood pressure at the time 

of discharge was well controlled at 119/56.


Patient also had aspirin 325 mg once daily added to her regimen.





Patient Condition at Discharge: Stable





Plan - Discharge Summary


Discharge Rx Participant: No


New Discharge Prescriptions: 


New


   Aspirin 325 mg PO DAILY  tab


   guaiFENesin [Mucinex] 600 mg PO Q12HR 10 Days #20 tablet.er


   Levofloxacin [Levaquin] 500 mg PO DAILY 10 Days #10 tab


   Metoprolol Tartrate [Lopressor] 25 mg PO BID  tab





Continue


   Nitroglycerin Sl Tabs [Nitrostat] 0.4 mg SUBLINGUAL Q5M PRN


     PRN Reason: Chest Pain


   HYDROcodone/APAP 5-325MG [Norco 5-325] 0.5 tab PO HS PRN


     PRN Reason: Pain


   Cyanocobalamin (Vitamin B-12) [Vitamin B-12] 1,000 mcg PO PC-LUNCH


   Lovastatin [Mevacor] 20 mg PO HS


   Lisinopril [Prinivil] 20 mg PO DAILY


   Isosorbide Mononitrate ER [Imdur] 30 mg PO AC-BRKFST


   cloNIDine HCL [Catapres] 0.1 mg PO DAILY


   Nepafenac [Ilevro] 1 drop LEFT EYE QAM


   Diltiazem HCl [Diltiazem 24Hr ER] 180 mg PO DAILY


   Ofloxacin 0.3% Ophth Soln [Ocuflox Ophth Soln] 1 drop LEFT EYE TID


   Magnesium 200 mg PO HS


Discharge Medication List





Cyanocobalamin (Vitamin B-12) [Vitamin B-12] 1,000 mcg PO PC-LUNCH 03/09/18 [

History]


Diltiazem HCl [Diltiazem 24Hr ER] 180 mg PO DAILY 03/09/18 [History]


HYDROcodone/APAP 5-325MG [Norco 5-325] 0.5 tab PO HS PRN 03/09/18 [History]


Isosorbide Mononitrate ER [Imdur] 30 mg PO AC-BRKFST 03/09/18 [History]


Lisinopril [Prinivil] 20 mg PO DAILY 03/09/18 [History]


Lovastatin [Mevacor] 20 mg PO HS 03/09/18 [History]


Magnesium 200 mg PO HS 03/09/18 [History]


Nepafenac [Ilevro] 1 drop LEFT EYE QAM 03/09/18 [History]


Nitroglycerin Sl Tabs [Nitrostat] 0.4 mg SUBLINGUAL Q5M PRN 03/09/18 [History]


Ofloxacin 0.3% Ophth Soln [Ocuflox Ophth Soln] 1 drop LEFT EYE TID 03/09/18 [

History]


cloNIDine HCL [Catapres] 0.1 mg PO DAILY 03/09/18 [History]


Aspirin 325 mg PO DAILY  tab 03/10/18 [Rx]


Levofloxacin [Levaquin] 500 mg PO DAILY 10 Days #10 tab 03/10/18 [Rx]


Metoprolol Tartrate [Lopressor] 25 mg PO BID  tab 03/10/18 [Rx]


guaiFENesin [Mucinex] 600 mg PO Q12HR 10 Days #20 tablet.er 03/10/18 [Rx]








Follow up Appointment(s)/Referral(s): 


Jade Melgar MD [Primary Care Provider] - 1-2 days

## 2018-03-12 ENCOUNTER — HOSPITAL ENCOUNTER (EMERGENCY)
Dept: HOSPITAL 47 - EC | Age: 78
Discharge: OTHER INSTITUTION | End: 2018-03-12
Payer: MEDICARE

## 2018-03-12 VITALS — RESPIRATION RATE: 18 BRPM

## 2018-03-12 VITALS — TEMPERATURE: 98.5 F

## 2018-03-12 VITALS — DIASTOLIC BLOOD PRESSURE: 45 MMHG | SYSTOLIC BLOOD PRESSURE: 90 MMHG | HEART RATE: 51 BPM

## 2018-03-12 DIAGNOSIS — Z88.8: ICD-10-CM

## 2018-03-12 DIAGNOSIS — E78.5: ICD-10-CM

## 2018-03-12 DIAGNOSIS — Z79.899: ICD-10-CM

## 2018-03-12 DIAGNOSIS — I25.10: ICD-10-CM

## 2018-03-12 DIAGNOSIS — I11.0: Primary | ICD-10-CM

## 2018-03-12 DIAGNOSIS — Z88.1: ICD-10-CM

## 2018-03-12 DIAGNOSIS — I50.9: ICD-10-CM

## 2018-03-12 LAB
ALBUMIN SERPL-MCNC: 3.5 G/DL (ref 3.5–5)
ALP SERPL-CCNC: 66 U/L (ref 38–126)
ALT SERPL-CCNC: 26 U/L (ref 9–52)
ANION GAP SERPL CALC-SCNC: 10 MMOL/L
APTT BLD: 24.2 SEC (ref 22–30)
AST SERPL-CCNC: 25 U/L (ref 14–36)
BASOPHILS # BLD AUTO: 0 K/UL (ref 0–0.2)
BASOPHILS NFR BLD AUTO: 1 %
BUN SERPL-SCNC: 17 MG/DL (ref 7–17)
CALCIUM SPEC-MCNC: 8.9 MG/DL (ref 8.4–10.2)
CHLORIDE SERPL-SCNC: 104 MMOL/L (ref 98–107)
CO2 SERPL-SCNC: 23 MMOL/L (ref 22–30)
EOSINOPHIL # BLD AUTO: 0.1 K/UL (ref 0–0.7)
EOSINOPHIL NFR BLD AUTO: 2 %
ERYTHROCYTE [DISTWIDTH] IN BLOOD BY AUTOMATED COUNT: 4.77 M/UL (ref 3.8–5.4)
ERYTHROCYTE [DISTWIDTH] IN BLOOD: 13.1 % (ref 11.5–15.5)
GLUCOSE SERPL-MCNC: 98 MG/DL (ref 74–99)
HCT VFR BLD AUTO: 40.6 % (ref 34–46)
HGB BLD-MCNC: 13.3 GM/DL (ref 11.4–16)
INR PPP: 1.1 (ref ?–1.2)
LYMPHOCYTES # SPEC AUTO: 1.2 K/UL (ref 1–4.8)
LYMPHOCYTES NFR SPEC AUTO: 24 %
MAGNESIUM SPEC-SCNC: 1.7 MG/DL (ref 1.6–2.3)
MCH RBC QN AUTO: 27.8 PG (ref 25–35)
MCHC RBC AUTO-ENTMCNC: 32.7 G/DL (ref 31–37)
MCV RBC AUTO: 85.1 FL (ref 80–100)
MONOCYTES # BLD AUTO: 0.3 K/UL (ref 0–1)
MONOCYTES NFR BLD AUTO: 6 %
NEUTROPHILS # BLD AUTO: 3.4 K/UL (ref 1.3–7.7)
NEUTROPHILS NFR BLD AUTO: 65 %
PLATELET # BLD AUTO: 231 K/UL (ref 150–450)
POTASSIUM SERPL-SCNC: 4.4 MMOL/L (ref 3.5–5.1)
PROT SERPL-MCNC: 6.2 G/DL (ref 6.3–8.2)
PT BLD: 10.3 SEC (ref 9–12)
SODIUM SERPL-SCNC: 137 MMOL/L (ref 137–145)
WBC # BLD AUTO: 5.3 K/UL (ref 3.8–10.6)

## 2018-03-12 PROCEDURE — 83735 ASSAY OF MAGNESIUM: CPT

## 2018-03-12 PROCEDURE — 83880 ASSAY OF NATRIURETIC PEPTIDE: CPT

## 2018-03-12 PROCEDURE — 99285 EMERGENCY DEPT VISIT HI MDM: CPT

## 2018-03-12 PROCEDURE — 85025 COMPLETE CBC W/AUTO DIFF WBC: CPT

## 2018-03-12 PROCEDURE — 36415 COLL VENOUS BLD VENIPUNCTURE: CPT

## 2018-03-12 PROCEDURE — 85610 PROTHROMBIN TIME: CPT

## 2018-03-12 PROCEDURE — 71046 X-RAY EXAM CHEST 2 VIEWS: CPT

## 2018-03-12 PROCEDURE — 80053 COMPREHEN METABOLIC PANEL: CPT

## 2018-03-12 PROCEDURE — 85730 THROMBOPLASTIN TIME PARTIAL: CPT

## 2018-03-12 PROCEDURE — 84484 ASSAY OF TROPONIN QUANT: CPT

## 2018-03-12 PROCEDURE — 93005 ELECTROCARDIOGRAM TRACING: CPT

## 2018-03-12 NOTE — ECHOF
Referral Reason:Left bundle branch block pattern twelve-lead ECG



MEASUREMENTS

--------

HEIGHT: 152.4 cm

WEIGHT: 77.1 kg

BP: 101/46

IVSd:   1.0 cm     (0.6 - 1.1)

LVIDd:   3.8 cm     (3.9 - 5.3)

LVPWd:   1.2 cm     (0.6 - 1.1)

IVSs:   1.6 cm

LVIDs:   2.4 cm

LVPWs:   1.5 cm

LAESV Index (A-L):   23.00 ml/m

Ao Diam:   3.4 cm     (2.0 - 3.7)

AV Cusp:   1.5 cm     (1.5 - 2.6)

LA Diam:   4.0 cm     (2.7 - 3.8)

MV EXCURSION:   14.317 mm     (> 18.000)

MV EF SLOPE:   69 mm/s     (70 - 150)

EPSS:   0.0 cm

MV E Jonathon:   0.74 m/s

MV DecT:   320 ms

MV A Jonathon:   1.18 m/s

MV E/A Ratio:   0.63 

AV maxP.35 mmHg

AV meanP.92 mmHg

RAP:   5.00 mmHg

RVSP:   22.30 mmHg







FINDINGS

--------

Sinus rhythm.

This was a technically good study.

The left ventricular size is normal.   There is mild concentric left ventricular hypertrophy.   Overa
ll left ventricular systolic function is normal with, an EF between 55 - 60 %.

The right ventricle is normal in size and function.

The left atrium is normal in size.

The right atrium is normal in size.

Aortic valve is trileaflet and is moderately thickened.   There is mild aortic stenosis present.   Pe
ak/mean gradient across the Aortic Valve is 19.35mmHg / 11.92mmHg.

The mitral valve leaflets are mildly thickened.   Mild mitral regurgitation is present.

Mild tricuspid regurgitation present.   The right ventricular systolic pressure, as measured by Doppl
er, is 22.30mmHg.

Pulmonic valve appears structurally normal.

The aortic root size is normal.

Normal inferior vena cava with normal inspiratory collapse consistent with estimated right atrial pre
ssure of  5 mmHg.

The pericardium is normal.



CONCLUSIONS

--------

1. Sinus rhythm.

2. This was a technically good study.

3. The left ventricular size is normal.

4. There is mild concentric left ventricular hypertrophy.

5. Overall left ventricular systolic function is normal with, an EF between 55 - 60 %.

6. The right ventricle is normal in size and function.

7. The left atrium is normal in size.

8. The right atrium is normal in size.

9. Aortic valve is trileaflet and is moderately thickened.

10. There is mild aortic stenosis present.

11. Peak/mean gradient across the Aortic Valve is 19.35mmHg / 11.92mmHg.

12. The mitral valve leaflets are mildly thickened.

13. Mild mitral regurgitation is present.

14. Mild tricuspid regurgitation present.

15. The right ventricular systolic pressure, as measured by Doppler, is 22.30mmHg.

16. Pulmonic valve appears structurally normal.

17. The aortic root size is normal.

18. Normal inferior vena cava with normal inspiratory collapse consistent with estimated right atrial
 pressure of  5 mmHg.

19. The pericardium is normal.





SONOGRAPHER: Sheree Rosas RDCS

## 2018-03-12 NOTE — ED
SOB HPI





- General


Chief Complaint: Shortness of Breath


Stated Complaint: Hx PNEUMONIA


Time Seen by Provider: 18 09:02


Source: patient


Mode of arrival: wheelchair


Limitations: no limitations





- History of Present Illness


Initial Comments: 





Patient complains of shortness of breath and cough.  She had a recent diagnosis 

of pneumonia.  She left the hospital AGAINST MEDICAL ADVICE.  She has been 

taking antibiotics as an outpatient.  While here, she did see cardiology.  They 

cleared her for discharge before she left.  Patient denies any pain or swelling 

in the arms or legs.  She has no palpitations.  She does still complain of a 

productive cough.  She states that she is feeling very short of breath.





- Related Data


 Home Medications











 Medication  Instructions  Recorded  Confirmed


 


Cyanocobalamin (Vitamin B-12) 1,000 mcg PO PC-LUNCH 18





[Vitamin B-12]   


 


Diltiazem HCl [Diltiazem 24Hr ER] 180 mg PO DAILY 18


 


HYDROcodone/APAP 5-325MG [Norco 0.5 tab PO HS PRN 18





5-325]   


 


Isosorbide Mononitrate ER [Imdur] 30 mg PO AC-BRKFST 18


 


Lisinopril [Prinivil] 20 mg PO DAILY 18


 


Lovastatin [Mevacor] 20 mg PO HS 18


 


Magnesium 200 mg PO HS 18


 


Nitroglycerin Sl Tabs [Nitrostat] 0.4 mg SUBLINGUAL Q5M PRN 18


 


cloNIDine HCL [Catapres] 0.1 mg PO DAILY 18








 Previous Rx's











 Medication  Instructions  Recorded


 


Aspirin 325 mg PO DAILY  tab 03/10/18


 


Levofloxacin [Levaquin] 500 mg PO DAILY 10 Days #10 tab 03/10/18


 


Metoprolol Tartrate [Lopressor] 25 mg PO BID  tab 03/10/18


 


guaiFENesin [Mucinex] 600 mg PO Q12HR 10 Days #20 03/10/18





 tablet.er 











 Allergies











Allergy/AdvReac Type Severity Reaction Status Date / Time


 


erythromycin base Allergy  Rash/Hives Verified 18 09:12


 


meclizine Allergy  Rash/Hives Verified 18 09:12














Review of Systems


ROS Statement: 


Those systems with pertinent positive or pertinent negative responses have been 

documented in the HPI.





ROS Other: All systems not noted in ROS Statement are negative.





Past Medical History


Past Medical History: Hyperlipidemia, Hypertension, Neurologic Disorder, 

Osteoarthritis (OA)


Additional Past Medical History / Comment(s): pvc 's  murmur  lbbb 

cardiomyopathy atheroscleroris


History of Any Multi-Drug Resistant Organisms: None Reported


Past Surgical History: Adenoidectomy, Appendectomy, Cholecystectomy, 

Hysterectomy, Joint Replacement, Orthopedic Surgery, Tonsillectomy


Additional Past Surgical History / Comment(s): lapband


Past Anesthesia/Blood Transfusion Reactions: Postoperative Nausea & Vomiting (

PONV)


Past Psychological History: No Psychological Hx Reported


Smoking Status: Never smoker


Past Alcohol Use History: None Reported


Past Drug Use History: None Reported





- Past Family History


  ** Father


Family Medical History: Cancer


Additional Family Medical History / Comment(s): Father had lymphosarcoma





  ** Mother


Family Medical History: Coronary Artery Disease (CAD), Myocardial Infarction (MI

)


Additional Family Medical History / Comment(s): Mother  of a MI at the age 

of 66yrs.





General Exam


Limitations: no limitations


General appearance: alert, in no apparent distress


Head exam: Present: atraumatic, normocephalic, normal inspection


Eye exam: Present: normal appearance, PERRL, EOMI.  Absent: scleral icterus, 

conjunctival injection, periorbital swelling


ENT exam: Present: normal exam, mucous membranes moist


Neck exam: Present: normal inspection.  Absent: tenderness, meningismus, 

lymphadenopathy


Respiratory exam: Present: normal lung sounds bilaterally.  Absent: respiratory 

distress, wheezes, rales, rhonchi, stridor


Cardiovascular Exam: Present: regular rate, normal rhythm, normal heart sounds.

  Absent: systolic murmur, diastolic murmur, rubs, gallop, clicks


GI/Abdominal exam: Present: soft, normal bowel sounds.  Absent: distended, 

tenderness, guarding, rebound, rigid


Extremities exam: Present: normal inspection, full ROM, normal capillary 

refill.  Absent: tenderness, pedal edema, joint swelling, calf tenderness


Back exam: Present: normal inspection


Neurological exam: Present: alert, oriented X3, CN II-XII intact


Psychiatric exam: Present: normal affect, normal mood


Skin exam: Present: warm, dry, intact, normal color.  Absent: rash





Course


 Vital Signs











  18





  08:54 08:59 09:59


 


Temperature 100.4 F H  


 


Pulse Rate 88 64 63


 


Respiratory 20 15 16





Rate   


 


Blood Pressure 145/71 100/53 115/58


 


O2 Sat by Pulse 94 L 93 L 96





Oximetry   














  18





  11:00 11:25 13:28


 


Temperature  98.5 F 


 


Pulse Rate 69  57 L


 


Respiratory 17  18





Rate   


 


Blood Pressure 131/61  87/42


 


O2 Sat by Pulse 95 95 94 L





Oximetry   














  18





  14:16


 


Temperature 


 


Pulse Rate 51 L


 


Respiratory 18





Rate 


 


Blood Pressure 90/45


 


O2 Sat by Pulse 96





Oximetry 














Medical Decision Making





- Medical Decision Making





Patient presents with shortness of breath.  Chest x-ray shows resolution of her 

pneumonia.  I obtained a troponin which is positive.  I obtained a serial 

troponin, which has increased.  She does have an elevated BNP.  The 

differential is broad, could be related to CHF, viral upper respiratory 

infection or numerous other etiologies.  I spoke with her doctor, and the 

patient will be admitted back to the hospital.





- Lab Data


Result diagrams: 


 18 09:20





 18 09:20


 Lab Results











  18 Range/Units





  09:20 09:20 09:20 


 


WBC  5.3    (3.8-10.6)  k/uL


 


RBC  4.77    (3.80-5.40)  m/uL


 


Hgb  13.3    (11.4-16.0)  gm/dL


 


Hct  40.6    (34.0-46.0)  %


 


MCV  85.1    (80.0-100.0)  fL


 


MCH  27.8    (25.0-35.0)  pg


 


MCHC  32.7    (31.0-37.0)  g/dL


 


RDW  13.1    (11.5-15.5)  %


 


Plt Count  231    (150-450)  k/uL


 


Neutrophils %  65    %


 


Lymphocytes %  24    %


 


Monocytes %  6    %


 


Eosinophils %  2    %


 


Basophils %  1    %


 


Neutrophils #  3.4    (1.3-7.7)  k/uL


 


Lymphocytes #  1.2    (1.0-4.8)  k/uL


 


Monocytes #  0.3    (0-1.0)  k/uL


 


Eosinophils #  0.1    (0-0.7)  k/uL


 


Basophils #  0.0    (0-0.2)  k/uL


 


PT     (9.0-12.0)  sec


 


INR     (<1.2)  


 


APTT     (22.0-30.0)  sec


 


Sodium   137   (137-145)  mmol/L


 


Potassium   4.4   (3.5-5.1)  mmol/L


 


Chloride   104   ()  mmol/L


 


Carbon Dioxide   23   (22-30)  mmol/L


 


Anion Gap   10   mmol/L


 


BUN   17   (7-17)  mg/dL


 


Creatinine   0.72   (0.52-1.04)  mg/dL


 


Est GFR (CKD-EPI)AfAm   >90   (>60 ml/min/1.73 sqM)  


 


Est GFR (CKD-EPI)NonAf   83   (>60 ml/min/1.73 sqM)  


 


Glucose   98   (74-99)  mg/dL


 


Calcium   8.9   (8.4-10.2)  mg/dL


 


Magnesium   1.7   (1.6-2.3)  mg/dL


 


Total Bilirubin   0.7   (0.2-1.3)  mg/dL


 


AST   25   (14-36)  U/L


 


ALT   26   (9-52)  U/L


 


Alkaline Phosphatase   66   ()  U/L


 


Troponin I     (0.000-0.034)  ng/mL


 


NT-Pro-B Natriuret Pep    825  pg/mL


 


Total Protein   6.2 L   (6.3-8.2)  g/dL


 


Albumin   3.5   (3.5-5.0)  g/dL














  18 Range/Units





  09:20 09:20 10:30 


 


WBC     (3.8-10.6)  k/uL


 


RBC     (3.80-5.40)  m/uL


 


Hgb     (11.4-16.0)  gm/dL


 


Hct     (34.0-46.0)  %


 


MCV     (80.0-100.0)  fL


 


MCH     (25.0-35.0)  pg


 


MCHC     (31.0-37.0)  g/dL


 


RDW     (11.5-15.5)  %


 


Plt Count     (150-450)  k/uL


 


Neutrophils %     %


 


Lymphocytes %     %


 


Monocytes %     %


 


Eosinophils %     %


 


Basophils %     %


 


Neutrophils #     (1.3-7.7)  k/uL


 


Lymphocytes #     (1.0-4.8)  k/uL


 


Monocytes #     (0-1.0)  k/uL


 


Eosinophils #     (0-0.7)  k/uL


 


Basophils #     (0-0.2)  k/uL


 


PT  10.3    (9.0-12.0)  sec


 


INR  1.1    (<1.2)  


 


APTT  24.2    (22.0-30.0)  sec


 


Sodium     (137-145)  mmol/L


 


Potassium     (3.5-5.1)  mmol/L


 


Chloride     ()  mmol/L


 


Carbon Dioxide     (22-30)  mmol/L


 


Anion Gap     mmol/L


 


BUN     (7-17)  mg/dL


 


Creatinine     (0.52-1.04)  mg/dL


 


Est GFR (CKD-EPI)AfAm     (>60 ml/min/1.73 sqM)  


 


Est GFR (CKD-EPI)NonAf     (>60 ml/min/1.73 sqM)  


 


Glucose     (74-99)  mg/dL


 


Calcium     (8.4-10.2)  mg/dL


 


Magnesium     (1.6-2.3)  mg/dL


 


Total Bilirubin     (0.2-1.3)  mg/dL


 


AST     (14-36)  U/L


 


ALT     (9-52)  U/L


 


Alkaline Phosphatase     ()  U/L


 


Troponin I   0.041 H*  0.045 H*  (0.000-0.034)  ng/mL


 


NT-Pro-B Natriuret Pep     pg/mL


 


Total Protein     (6.3-8.2)  g/dL


 


Albumin     (3.5-5.0)  g/dL














18 14:18


Twelve-lead EKG shows ventricular rate 78 bpm, there is a left bundle-branch 

block, there is no ST elevation or depression, interpreted by me as sinus 

rhythm.





Disposition


Clinical Impression: 


 Congestive heart failure





Disposition: ADMITTED AS IP TO THIS HOSP


Condition: Fair


Referrals: 


Jade Melgar MD [Primary Care Provider] - 1-2 days


Time of Disposition: 14:19

## 2018-03-12 NOTE — XR
EXAMINATION TYPE: XR chest 2V

 

DATE OF EXAM: 3/12/2018

 

COMPARISON: 3/9/2018

 

HISTORY: Pneumonia and shortness of breath

 

TECHNIQUE:  Frontal and lateral views of the chest are obtained.

 

FINDINGS:  There is improved aeration of the lungs in the right basilar opacity has resolved in the i
nterim. There is no focal air space opacity, pleural effusion, or pneumothorax seen.  The cardiac lucila
houette size is within normal limits.   The osseous structures are intact. There is generalized osseo
us demineralization mild multilevel degenerative changes of the thoracic spine.

 

IMPRESSION:  Resolved right basilar opacity. No new focal consolidation. No acute cardiopulmonary pro
cess.

## 2018-04-23 ENCOUNTER — HOSPITAL ENCOUNTER (OUTPATIENT)
Dept: HOSPITAL 47 - LABWHC1 | Age: 78
Discharge: HOME | End: 2018-04-23
Payer: MEDICARE

## 2018-04-23 DIAGNOSIS — E03.9: Primary | ICD-10-CM

## 2018-04-23 DIAGNOSIS — I25.10: ICD-10-CM

## 2018-04-23 DIAGNOSIS — I73.9: ICD-10-CM

## 2018-04-23 LAB
ALBUMIN SERPL-MCNC: 3.6 G/DL (ref 3.5–5)
ALP SERPL-CCNC: 62 U/L (ref 38–126)
ALT SERPL-CCNC: 21 U/L (ref 9–52)
ANION GAP SERPL CALC-SCNC: 11 MMOL/L
AST SERPL-CCNC: 23 U/L (ref 14–36)
BUN SERPL-SCNC: 20 MG/DL (ref 7–17)
CALCIUM SPEC-MCNC: 9.4 MG/DL (ref 8.4–10.2)
CHLORIDE SERPL-SCNC: 108 MMOL/L (ref 98–107)
CHOLEST SERPL-MCNC: 175 MG/DL (ref ?–200)
CO2 SERPL-SCNC: 22 MMOL/L (ref 22–30)
ERYTHROCYTE [DISTWIDTH] IN BLOOD BY AUTOMATED COUNT: 4.66 M/UL (ref 3.8–5.4)
ERYTHROCYTE [DISTWIDTH] IN BLOOD: 13.8 % (ref 11.5–15.5)
GLUCOSE SERPL-MCNC: 97 MG/DL (ref 74–99)
HCT VFR BLD AUTO: 40.6 % (ref 34–46)
HDLC SERPL-MCNC: 67 MG/DL (ref 40–60)
HGB BLD-MCNC: 13.5 GM/DL (ref 11.4–16)
LDLC SERPL CALC-MCNC: 95 MG/DL (ref 0–99)
MAGNESIUM SPEC-SCNC: 1.9 MG/DL (ref 1.6–2.3)
MCH RBC QN AUTO: 29 PG (ref 25–35)
MCHC RBC AUTO-ENTMCNC: 33.3 G/DL (ref 31–37)
MCV RBC AUTO: 87 FL (ref 80–100)
PLATELET # BLD AUTO: 287 K/UL (ref 150–450)
POTASSIUM SERPL-SCNC: 4.8 MMOL/L (ref 3.5–5.1)
PROT SERPL-MCNC: 6 G/DL (ref 6.3–8.2)
SODIUM SERPL-SCNC: 141 MMOL/L (ref 137–145)
TRIGL SERPL-MCNC: 64 MG/DL (ref ?–150)
WBC # BLD AUTO: 6.5 K/UL (ref 3.8–10.6)

## 2018-04-23 PROCEDURE — 36415 COLL VENOUS BLD VENIPUNCTURE: CPT

## 2018-04-23 PROCEDURE — 85027 COMPLETE CBC AUTOMATED: CPT

## 2018-04-23 PROCEDURE — 84443 ASSAY THYROID STIM HORMONE: CPT

## 2018-04-23 PROCEDURE — 80061 LIPID PANEL: CPT

## 2018-04-23 PROCEDURE — 80053 COMPREHEN METABOLIC PANEL: CPT

## 2018-04-23 PROCEDURE — 83735 ASSAY OF MAGNESIUM: CPT

## 2018-06-25 ENCOUNTER — HOSPITAL ENCOUNTER (OUTPATIENT)
Dept: HOSPITAL 47 - RADMAMWWP | Age: 78
Discharge: HOME | End: 2018-06-25
Payer: MEDICARE

## 2018-06-25 DIAGNOSIS — Z12.31: Primary | ICD-10-CM

## 2018-06-25 PROCEDURE — 77067 SCR MAMMO BI INCL CAD: CPT

## 2018-06-26 NOTE — MM
Reason for exam: screening  (asymptomatic).

Last mammogram was performed 3 years and 1 month ago.



History:

Patient is postmenopausal.

Family history of premenopausal breast cancer in sister, premenopausal breast 

cancer in mother, and premenopausal breast cancer in grandmother.



Physical Findings:

A clinical breast exam by your physician is recommended on an annual basis and 

results should be correlated with mammographic findings.



MG Screening Mammo w CAD

Bilateral CC and MLO view(s) were taken.

Prior study comparison: May 20, 2015, bilateral MG screening mammo w CAD.  

November 30, 2012, CAD bilateral diagnostic mammogram.

There are scattered fibroglandular densities.  Stable benign calcifications. There

is no discrete abnormality.  No significant changes when compared with prior 

studies.





ASSESSMENT: Benign, BI-RAD 2



RECOMMENDATION:

Routine screening mammogram of both breasts in 1 year.